# Patient Record
Sex: FEMALE | Race: WHITE | NOT HISPANIC OR LATINO
[De-identification: names, ages, dates, MRNs, and addresses within clinical notes are randomized per-mention and may not be internally consistent; named-entity substitution may affect disease eponyms.]

---

## 2020-07-28 ENCOUNTER — APPOINTMENT (OUTPATIENT)
Dept: COLORECTAL SURGERY | Facility: CLINIC | Age: 55
End: 2020-07-28
Payer: COMMERCIAL

## 2020-07-28 ENCOUNTER — TRANSCRIPTION ENCOUNTER (OUTPATIENT)
Age: 55
End: 2020-07-28

## 2020-07-28 VITALS
WEIGHT: 188 LBS | TEMPERATURE: 98.3 F | SYSTOLIC BLOOD PRESSURE: 159 MMHG | HEIGHT: 64 IN | HEART RATE: 96 BPM | DIASTOLIC BLOOD PRESSURE: 92 MMHG | BODY MASS INDEX: 32.1 KG/M2

## 2020-07-28 DIAGNOSIS — Z82.49 FAMILY HISTORY OF ISCHEMIC HEART DISEASE AND OTHER DISEASES OF THE CIRCULATORY SYSTEM: ICD-10-CM

## 2020-07-28 DIAGNOSIS — I10 ESSENTIAL (PRIMARY) HYPERTENSION: ICD-10-CM

## 2020-07-28 DIAGNOSIS — Z78.9 OTHER SPECIFIED HEALTH STATUS: ICD-10-CM

## 2020-07-28 DIAGNOSIS — Z80.0 FAMILY HISTORY OF MALIGNANT NEOPLASM OF DIGESTIVE ORGANS: ICD-10-CM

## 2020-07-28 DIAGNOSIS — D64.9 ANEMIA, UNSPECIFIED: ICD-10-CM

## 2020-07-28 DIAGNOSIS — K63.9 DISEASE OF INTESTINE, UNSPECIFIED: ICD-10-CM

## 2020-07-28 PROBLEM — Z00.00 ENCOUNTER FOR PREVENTIVE HEALTH EXAMINATION: Status: ACTIVE | Noted: 2020-07-28

## 2020-07-28 PROCEDURE — 99205 OFFICE O/P NEW HI 60 MIN: CPT

## 2020-07-28 RX ORDER — HYDROCHLOROTHIAZIDE 25 MG/1
25 TABLET ORAL
Refills: 0 | Status: ACTIVE | COMMUNITY

## 2020-07-28 RX ORDER — NEOMYCIN SULFATE 500 MG/1
500 TABLET ORAL
Qty: 6 | Refills: 0 | Status: ACTIVE | COMMUNITY
Start: 2020-07-28 | End: 1900-01-01

## 2020-07-28 RX ORDER — MULTIVIT-MIN/IRON/FOLIC ACID/K 18-600-40
500 CAPSULE ORAL
Refills: 0 | Status: ACTIVE | COMMUNITY

## 2020-07-28 RX ORDER — AMLODIPINE BESYLATE 10 MG/1
10 TABLET ORAL
Refills: 0 | Status: ACTIVE | COMMUNITY

## 2020-07-28 RX ORDER — ADHESIVE TAPE 3"X 2.3 YD
50 MCG TAPE, NON-MEDICATED TOPICAL
Refills: 0 | Status: ACTIVE | COMMUNITY

## 2020-07-28 RX ORDER — METRONIDAZOLE 500 MG/1
500 TABLET ORAL
Qty: 6 | Refills: 0 | Status: ACTIVE | COMMUNITY
Start: 2020-07-28 | End: 1900-01-01

## 2020-07-28 RX ORDER — FERROUS SULFATE 325(65) MG
325 (65 FE) TABLET ORAL
Refills: 0 | Status: ACTIVE | COMMUNITY

## 2020-07-28 NOTE — ASSESSMENT
[FreeTextEntry1] : I had extensive discussion with the patient (45 minutes) regarding the diagnosis and treatment options. I recommended that he consider proceeding with a robotic right hemicolectomy possible open procedure.\par \par The associated risks, benefits, alternatives of the procedure have been outlined discussed and reviewed with the patient's family. These risks including but not limited to bleeding, infection, anastomotic leak, need for secondary surgery, need for ileostomy or colostomy creation, change in bowel habits,  DVT, PE,  as well as the risk of heart and lung complications infection and death were detailed. The patient understands these risks and consents the planned procedure. Appropriate  literature regarding surgery and post operative treatment/complications and enhanced recovery pathway has been detailed and reviewed. Consent was obtained. All questions were answered.\par

## 2020-07-28 NOTE — HISTORY OF PRESENT ILLNESS
[FreeTextEntry1] : 53 yo F presents for evaluation of cecal mass, referred by Dr. Cardenas\par \par Patient had been experiencing worsening heartburn after switching blood pressure medications. She saw her PCP and cardiologist. Labs indicated anemia of Hgb/Hct 10.7/35.9, MCV 79, ferritin 7, iron 25, has since started iron supplementation.\par Underwent initial EGD/Colonoscopy w/ Dr. Chace Olvera at [facility] yesterday (20)\par Final report not available, however patient presents with scope images.\par (+) cecal mass, biopsied\par (+) diverticulum in sigmoid colon, inverted\par (+) skin tags\par EGD:\par (+) esophagitis in lower third of esophagus, hiatal hernia present\par (+) polyps in gastric body\par Patient admits to feeling more tired. Appetite has been normal, however slightly reduced this last week dur to anxiety.\par Denies fever, weight loss, abd pain, n/v.\par Denies change in BHs or BPR\par BH: daily\par Grandfather w/ cirrhosis/liver cancer ?metastasized to colon,  in age 40s. hx of alcoholism. Denies FMH colorectal CA in parents or siblings. Maternal Aunt w/ Crohn's disease.

## 2020-07-28 NOTE — PHYSICAL EXAM
[Abdomen Masses] : No abdominal masses [Abdomen Tenderness] : ~T No ~M abdominal tenderness [No HSM] : no hepatosplenomegaly [JVD] : no jugular venous distention  [Normal Heart Sounds] : normal heart sounds [Normal Breath Sounds] : Normal breath sounds [No Rash or Lesion] : No rash or lesion [Alert] : alert [Calm] : calm [de-identified] : well healed lower midline incision

## 2020-07-29 LAB
APTT BLD: 32.7 SEC
CEA SERPL-MCNC: 5.3 NG/ML
INR PPP: 1.01 RATIO
PT BLD: 11.9 SEC

## 2020-07-31 ENCOUNTER — OUTPATIENT (OUTPATIENT)
Dept: OUTPATIENT SERVICES | Facility: HOSPITAL | Age: 55
LOS: 1 days | End: 2020-07-31
Payer: COMMERCIAL

## 2020-07-31 ENCOUNTER — RESULT REVIEW (OUTPATIENT)
Age: 55
End: 2020-07-31

## 2020-07-31 DIAGNOSIS — C18.9 MALIGNANT NEOPLASM OF COLON, UNSPECIFIED: ICD-10-CM

## 2020-07-31 LAB — SURGICAL PATHOLOGY STUDY: SIGNIFICANT CHANGE UP

## 2020-07-31 PROCEDURE — 88321 CONSLTJ&REPRT SLD PREP ELSWR: CPT

## 2020-08-01 ENCOUNTER — RESULT REVIEW (OUTPATIENT)
Age: 55
End: 2020-08-01

## 2020-08-03 ENCOUNTER — TRANSCRIPTION ENCOUNTER (OUTPATIENT)
Age: 55
End: 2020-08-03

## 2020-08-03 NOTE — PATIENT PROFILE ADULT - VISION (WITH CORRECTIVE LENSES IF THE PATIENT USUALLY WEARS THEM):
Normal vision: sees adequately in most situations; can see medication labels, newsprint/wears contact

## 2020-08-04 ENCOUNTER — RESULT REVIEW (OUTPATIENT)
Age: 55
End: 2020-08-04

## 2020-08-04 ENCOUNTER — INPATIENT (INPATIENT)
Facility: HOSPITAL | Age: 55
LOS: 2 days | Discharge: HOME | DRG: 331 | End: 2020-08-07
Attending: SURGERY | Admitting: SURGERY
Payer: COMMERCIAL

## 2020-08-04 ENCOUNTER — APPOINTMENT (OUTPATIENT)
Dept: COLORECTAL SURGERY | Facility: HOSPITAL | Age: 55
End: 2020-08-04

## 2020-08-04 VITALS
WEIGHT: 185.85 LBS | TEMPERATURE: 97 F | RESPIRATION RATE: 18 BRPM | HEART RATE: 92 BPM | DIASTOLIC BLOOD PRESSURE: 75 MMHG | SYSTOLIC BLOOD PRESSURE: 162 MMHG | OXYGEN SATURATION: 99 % | HEIGHT: 64 IN

## 2020-08-04 DIAGNOSIS — Z90.89 ACQUIRED ABSENCE OF OTHER ORGANS: Chronic | ICD-10-CM

## 2020-08-04 DIAGNOSIS — Z98.891 HISTORY OF UTERINE SCAR FROM PREVIOUS SURGERY: Chronic | ICD-10-CM

## 2020-08-04 LAB
ANION GAP SERPL CALC-SCNC: 16 MMOL/L — SIGNIFICANT CHANGE UP (ref 5–17)
BUN SERPL-MCNC: 12 MG/DL — SIGNIFICANT CHANGE UP (ref 7–23)
CALCIUM SERPL-MCNC: 9.7 MG/DL — SIGNIFICANT CHANGE UP (ref 8.4–10.5)
CHLORIDE SERPL-SCNC: 99 MMOL/L — SIGNIFICANT CHANGE UP (ref 96–108)
CO2 SERPL-SCNC: 23 MMOL/L — SIGNIFICANT CHANGE UP (ref 22–31)
CREAT SERPL-MCNC: 0.94 MG/DL — SIGNIFICANT CHANGE UP (ref 0.5–1.3)
GLUCOSE BLDC GLUCOMTR-MCNC: 126 MG/DL — HIGH (ref 70–99)
GLUCOSE BLDC GLUCOMTR-MCNC: 172 MG/DL — HIGH (ref 70–99)
GLUCOSE SERPL-MCNC: 145 MG/DL — HIGH (ref 70–99)
HCT VFR BLD CALC: 37.5 % — SIGNIFICANT CHANGE UP (ref 34.5–45)
HGB BLD-MCNC: 11.7 G/DL — SIGNIFICANT CHANGE UP (ref 11.5–15.5)
MAGNESIUM SERPL-MCNC: 2.2 MG/DL — SIGNIFICANT CHANGE UP (ref 1.6–2.6)
MCHC RBC-ENTMCNC: 24.4 PG — LOW (ref 27–34)
MCHC RBC-ENTMCNC: 31.2 GM/DL — LOW (ref 32–36)
MCV RBC AUTO: 78.3 FL — LOW (ref 80–100)
NRBC # BLD: 0 /100 WBCS — SIGNIFICANT CHANGE UP (ref 0–0)
PHOSPHATE SERPL-MCNC: 3.5 MG/DL — SIGNIFICANT CHANGE UP (ref 2.5–4.5)
PLATELET # BLD AUTO: 357 K/UL — SIGNIFICANT CHANGE UP (ref 150–400)
POTASSIUM SERPL-MCNC: 2.9 MMOL/L — CRITICAL LOW (ref 3.5–5.3)
POTASSIUM SERPL-SCNC: 2.9 MMOL/L — CRITICAL LOW (ref 3.5–5.3)
RBC # BLD: 4.79 M/UL — SIGNIFICANT CHANGE UP (ref 3.8–5.2)
RBC # FLD: 18.2 % — HIGH (ref 10.3–14.5)
SODIUM SERPL-SCNC: 138 MMOL/L — SIGNIFICANT CHANGE UP (ref 135–145)
WBC # BLD: 9.54 K/UL — SIGNIFICANT CHANGE UP (ref 3.8–10.5)
WBC # FLD AUTO: 9.54 K/UL — SIGNIFICANT CHANGE UP (ref 3.8–10.5)

## 2020-08-04 PROCEDURE — 44205 LAP COLECTOMY PART W/ILEUM: CPT | Mod: GC

## 2020-08-04 PROCEDURE — 88309 TISSUE EXAM BY PATHOLOGIST: CPT | Mod: 26

## 2020-08-04 RX ORDER — HYDROMORPHONE HYDROCHLORIDE 2 MG/ML
0.5 INJECTION INTRAMUSCULAR; INTRAVENOUS; SUBCUTANEOUS EVERY 4 HOURS
Refills: 0 | Status: DISCONTINUED | OUTPATIENT
Start: 2020-08-04 | End: 2020-08-06

## 2020-08-04 RX ORDER — OXYCODONE HYDROCHLORIDE 5 MG/1
5 TABLET ORAL EVERY 4 HOURS
Refills: 0 | Status: DISCONTINUED | OUTPATIENT
Start: 2020-08-04 | End: 2020-08-05

## 2020-08-04 RX ORDER — ENOXAPARIN SODIUM 100 MG/ML
40 INJECTION SUBCUTANEOUS EVERY 24 HOURS
Refills: 0 | Status: DISCONTINUED | OUTPATIENT
Start: 2020-08-04 | End: 2020-08-07

## 2020-08-04 RX ORDER — ACETAMINOPHEN 500 MG
1000 TABLET ORAL ONCE
Refills: 0 | Status: COMPLETED | OUTPATIENT
Start: 2020-08-04 | End: 2020-08-04

## 2020-08-04 RX ORDER — AMLODIPINE BESYLATE 2.5 MG/1
10 TABLET ORAL DAILY
Refills: 0 | Status: DISCONTINUED | OUTPATIENT
Start: 2020-08-05 | End: 2020-08-07

## 2020-08-04 RX ORDER — ACETAMINOPHEN 500 MG
1000 TABLET ORAL EVERY 6 HOURS
Refills: 0 | Status: DISCONTINUED | OUTPATIENT
Start: 2020-08-04 | End: 2020-08-07

## 2020-08-04 RX ORDER — OXYCODONE HYDROCHLORIDE 5 MG/1
2.5 TABLET ORAL EVERY 4 HOURS
Refills: 0 | Status: DISCONTINUED | OUTPATIENT
Start: 2020-08-04 | End: 2020-08-05

## 2020-08-04 RX ORDER — POTASSIUM CHLORIDE 20 MEQ
40 PACKET (EA) ORAL EVERY 4 HOURS
Refills: 0 | Status: COMPLETED | OUTPATIENT
Start: 2020-08-04 | End: 2020-08-04

## 2020-08-04 RX ORDER — POTASSIUM CHLORIDE 20 MEQ
10 PACKET (EA) ORAL
Refills: 0 | Status: COMPLETED | OUTPATIENT
Start: 2020-08-04 | End: 2020-08-04

## 2020-08-04 RX ORDER — SODIUM CHLORIDE 9 MG/ML
1000 INJECTION, SOLUTION INTRAVENOUS
Refills: 0 | Status: DISCONTINUED | OUTPATIENT
Start: 2020-08-04 | End: 2020-08-07

## 2020-08-04 RX ORDER — ONDANSETRON 8 MG/1
4 TABLET, FILM COATED ORAL EVERY 6 HOURS
Refills: 0 | Status: DISCONTINUED | OUTPATIENT
Start: 2020-08-04 | End: 2020-08-07

## 2020-08-04 RX ORDER — HEPARIN SODIUM 5000 [USP'U]/ML
5000 INJECTION INTRAVENOUS; SUBCUTANEOUS ONCE
Refills: 0 | Status: COMPLETED | OUTPATIENT
Start: 2020-08-04 | End: 2020-08-04

## 2020-08-04 RX ORDER — GABAPENTIN 400 MG/1
600 CAPSULE ORAL ONCE
Refills: 0 | Status: COMPLETED | OUTPATIENT
Start: 2020-08-04 | End: 2020-08-04

## 2020-08-04 RX ADMIN — Medication 1000 MILLIGRAM(S): at 14:16

## 2020-08-04 RX ADMIN — HEPARIN SODIUM 5000 UNIT(S): 5000 INJECTION INTRAVENOUS; SUBCUTANEOUS at 07:45

## 2020-08-04 RX ADMIN — ENOXAPARIN SODIUM 40 MILLIGRAM(S): 100 INJECTION SUBCUTANEOUS at 17:42

## 2020-08-04 RX ADMIN — Medication 100 MILLIEQUIVALENT(S): at 17:43

## 2020-08-04 RX ADMIN — Medication 100 MILLIEQUIVALENT(S): at 20:57

## 2020-08-04 RX ADMIN — Medication 1000 MILLIGRAM(S): at 20:57

## 2020-08-04 RX ADMIN — Medication 1000 MILLIGRAM(S): at 21:57

## 2020-08-04 RX ADMIN — Medication 1000 MILLIGRAM(S): at 14:01

## 2020-08-04 RX ADMIN — HYDROMORPHONE HYDROCHLORIDE 0.5 MILLIGRAM(S): 2 INJECTION INTRAMUSCULAR; INTRAVENOUS; SUBCUTANEOUS at 12:50

## 2020-08-04 RX ADMIN — HYDROMORPHONE HYDROCHLORIDE 0.5 MILLIGRAM(S): 2 INJECTION INTRAMUSCULAR; INTRAVENOUS; SUBCUTANEOUS at 12:35

## 2020-08-04 RX ADMIN — Medication 40 MILLIEQUIVALENT(S): at 17:42

## 2020-08-04 RX ADMIN — Medication 40 MILLIEQUIVALENT(S): at 14:01

## 2020-08-04 RX ADMIN — GABAPENTIN 600 MILLIGRAM(S): 400 CAPSULE ORAL at 07:44

## 2020-08-04 RX ADMIN — Medication 100 MILLIEQUIVALENT(S): at 14:17

## 2020-08-04 RX ADMIN — Medication 1000 MILLIGRAM(S): at 07:44

## 2020-08-04 NOTE — BRIEF OPERATIVE NOTE - NSICDXBRIEFPROCEDURE_GEN_ALL_CORE_FT
PROCEDURES:  Robot-assisted laparoscopic right hemicolectomy using da Linda Xi 04-Aug-2020 11:23:41  Niko Mann

## 2020-08-04 NOTE — BRIEF OPERATIVE NOTE - OPERATION/FINDINGS
Diagnostic laparoscopy revealing large cecal mass. Robotic mobilization (media-to-lateral) of ascending colon with visualization and protection of duodenum and right ureter. Right branch of the middle colic and ileocolic vessels identified, isolated and ligated using robotic vessel sealer. Terminal ileum and transverse colon divided using the blue robotic stapler. ICG used and verified perfusion of both resection margins. Subsequent creation of isoperistaltic side-to-side ileocolic anastomosis with robotic blue 60mm stapler and common enterotomy closed using running barbed suture in 2layers. Hemostasis achieved at the end of the case. Fascia closed primarily in multiple layers using colorectal bundle closing tray. Skin closed with monocryl.

## 2020-08-04 NOTE — H&P ADULT - NSHPPHYSICALEXAM_GEN_ALL_CORE
Constitutional: WDWN NAD  Heart: S1 S2  Lungs: breathing with minimal effort, without use of accessory muscles  Abdomen: soft, nontender, nondistended, without masses, ecchymosis,erythema

## 2020-08-04 NOTE — H&P ADULT - ASSESSMENT
54 year old female with PMH of HTN, cecal mass presents for right hemicolectomy.     Plan:  To OR  ERAS protocol  colon bundle  admission post op

## 2020-08-04 NOTE — H&P ADULT - HISTORY OF PRESENT ILLNESS
54 year old female with PMH of HTN, cecal mass presents for right hemicolectomy. Patient originally presented to her PCP and cardiologist with worsening heartburn after switching blood pressure medications and was found to have iron deficiency anemia. Patient underwent EGD and colonscopy on 7/27/20 which found a cecal mass that was biopsied. Patient currently has no complaints.

## 2020-08-04 NOTE — PROGRESS NOTE ADULT - SUBJECTIVE AND OBJECTIVE BOX
Procedure: Right Hemicolectomy   Surgeon: Danae    S: Pt has no complaints. Denies CP, SOB, HARRY, calf tenderness. Pain controlled with medication.    O:  T(C): 36.9 (08-04-20 @ 11:32), Max: 36.9 (08-04-20 @ 11:32)  T(F): 98.5 (08-04-20 @ 11:32), Max: 98.5 (08-04-20 @ 11:32)  HR: 85 (08-04-20 @ 13:00) (85 - 102)  BP: 146/75 (08-04-20 @ 13:00) (141/73 - 148/78)  RR: 11 (08-04-20 @ 13:00) (10 - 25)  SpO2: 99% (08-04-20 @ 13:00) (99% - 100%)  Wt(kg): --                        11.7   9.54  )-----------( 357      ( 04 Aug 2020 12:41 )             37.5     08-04    138  |  99  |  12  ----------------------------<  145<H>  2.9<LL>   |  23  |  0.94    Ca    9.7      04 Aug 2020 12:41  Phos  3.5     08-04  Mg     2.2     08-04        Gen: NAD, resting comfortably in bed  C/V: NSR  Pulm: Nonlabored breathing, no respiratory distress  Abd: soft, NT/ND Incision: CDI  Extrem: WWP, no calf edema, SCDs in place      A/P: 54yFemale s/p Right hemicolectomy   Diet: CLD  IVF: LR@40cc/hr  Pain/nausea control  DVT ppx: LVX  Hull to gravity

## 2020-08-05 LAB
ANION GAP SERPL CALC-SCNC: 11 MMOL/L — SIGNIFICANT CHANGE UP (ref 5–17)
BASOPHILS # BLD AUTO: 0.03 K/UL — SIGNIFICANT CHANGE UP (ref 0–0.2)
BASOPHILS NFR BLD AUTO: 0.3 % — SIGNIFICANT CHANGE UP (ref 0–2)
BUN SERPL-MCNC: 11 MG/DL — SIGNIFICANT CHANGE UP (ref 7–23)
CALCIUM SERPL-MCNC: 9.1 MG/DL — SIGNIFICANT CHANGE UP (ref 8.4–10.5)
CHLORIDE SERPL-SCNC: 102 MMOL/L — SIGNIFICANT CHANGE UP (ref 96–108)
CO2 SERPL-SCNC: 26 MMOL/L — SIGNIFICANT CHANGE UP (ref 22–31)
CREAT SERPL-MCNC: 0.66 MG/DL — SIGNIFICANT CHANGE UP (ref 0.5–1.3)
EOSINOPHIL # BLD AUTO: 0 K/UL — SIGNIFICANT CHANGE UP (ref 0–0.5)
EOSINOPHIL NFR BLD AUTO: 0 % — SIGNIFICANT CHANGE UP (ref 0–6)
GLUCOSE SERPL-MCNC: 115 MG/DL — HIGH (ref 70–99)
HCT VFR BLD CALC: 33.1 % — LOW (ref 34.5–45)
HGB BLD-MCNC: 10.3 G/DL — LOW (ref 11.5–15.5)
IMM GRANULOCYTES NFR BLD AUTO: 0.4 % — SIGNIFICANT CHANGE UP (ref 0–1.5)
LYMPHOCYTES # BLD AUTO: 1.93 K/UL — SIGNIFICANT CHANGE UP (ref 1–3.3)
LYMPHOCYTES # BLD AUTO: 17.3 % — SIGNIFICANT CHANGE UP (ref 13–44)
MAGNESIUM SERPL-MCNC: 2.3 MG/DL — SIGNIFICANT CHANGE UP (ref 1.6–2.6)
MCHC RBC-ENTMCNC: 24.8 PG — LOW (ref 27–34)
MCHC RBC-ENTMCNC: 31.1 GM/DL — LOW (ref 32–36)
MCV RBC AUTO: 79.8 FL — LOW (ref 80–100)
MONOCYTES # BLD AUTO: 1.14 K/UL — HIGH (ref 0–0.9)
MONOCYTES NFR BLD AUTO: 10.2 % — SIGNIFICANT CHANGE UP (ref 2–14)
NEUTROPHILS # BLD AUTO: 8.04 K/UL — HIGH (ref 1.8–7.4)
NEUTROPHILS NFR BLD AUTO: 71.8 % — SIGNIFICANT CHANGE UP (ref 43–77)
NRBC # BLD: 0 /100 WBCS — SIGNIFICANT CHANGE UP (ref 0–0)
PHOSPHATE SERPL-MCNC: 2.6 MG/DL — SIGNIFICANT CHANGE UP (ref 2.5–4.5)
PLATELET # BLD AUTO: 332 K/UL — SIGNIFICANT CHANGE UP (ref 150–400)
POTASSIUM SERPL-MCNC: 3.4 MMOL/L — LOW (ref 3.5–5.3)
POTASSIUM SERPL-SCNC: 3.4 MMOL/L — LOW (ref 3.5–5.3)
RBC # BLD: 4.15 M/UL — SIGNIFICANT CHANGE UP (ref 3.8–5.2)
RBC # FLD: 18.8 % — HIGH (ref 10.3–14.5)
SODIUM SERPL-SCNC: 139 MMOL/L — SIGNIFICANT CHANGE UP (ref 135–145)
WBC # BLD: 11.18 K/UL — HIGH (ref 3.8–10.5)
WBC # FLD AUTO: 11.18 K/UL — HIGH (ref 3.8–10.5)

## 2020-08-05 RX ORDER — OXYCODONE HYDROCHLORIDE 5 MG/1
5 TABLET ORAL EVERY 4 HOURS
Refills: 0 | Status: DISCONTINUED | OUTPATIENT
Start: 2020-08-05 | End: 2020-08-07

## 2020-08-05 RX ORDER — OXYCODONE HYDROCHLORIDE 5 MG/1
10 TABLET ORAL EVERY 4 HOURS
Refills: 0 | Status: DISCONTINUED | OUTPATIENT
Start: 2020-08-05 | End: 2020-08-07

## 2020-08-05 RX ORDER — POTASSIUM CHLORIDE 20 MEQ
40 PACKET (EA) ORAL ONCE
Refills: 0 | Status: COMPLETED | OUTPATIENT
Start: 2020-08-05 | End: 2020-08-05

## 2020-08-05 RX ORDER — POTASSIUM PHOSPHATE, MONOBASIC POTASSIUM PHOSPHATE, DIBASIC 236; 224 MG/ML; MG/ML
15 INJECTION, SOLUTION INTRAVENOUS ONCE
Refills: 0 | Status: COMPLETED | OUTPATIENT
Start: 2020-08-05 | End: 2020-08-05

## 2020-08-05 RX ORDER — LANOLIN ALCOHOL/MO/W.PET/CERES
3 CREAM (GRAM) TOPICAL ONCE
Refills: 0 | Status: DISCONTINUED | OUTPATIENT
Start: 2020-08-05 | End: 2020-08-07

## 2020-08-05 RX ADMIN — Medication 1000 MILLIGRAM(S): at 15:24

## 2020-08-05 RX ADMIN — HYDROMORPHONE HYDROCHLORIDE 0.5 MILLIGRAM(S): 2 INJECTION INTRAMUSCULAR; INTRAVENOUS; SUBCUTANEOUS at 05:25

## 2020-08-05 RX ADMIN — OXYCODONE HYDROCHLORIDE 5 MILLIGRAM(S): 5 TABLET ORAL at 01:04

## 2020-08-05 RX ADMIN — OXYCODONE HYDROCHLORIDE 5 MILLIGRAM(S): 5 TABLET ORAL at 00:04

## 2020-08-05 RX ADMIN — HYDROMORPHONE HYDROCHLORIDE 0.5 MILLIGRAM(S): 2 INJECTION INTRAMUSCULAR; INTRAVENOUS; SUBCUTANEOUS at 05:10

## 2020-08-05 RX ADMIN — OXYCODONE HYDROCHLORIDE 5 MILLIGRAM(S): 5 TABLET ORAL at 08:16

## 2020-08-05 RX ADMIN — ENOXAPARIN SODIUM 40 MILLIGRAM(S): 100 INJECTION SUBCUTANEOUS at 21:24

## 2020-08-05 RX ADMIN — Medication 1000 MILLIGRAM(S): at 09:13

## 2020-08-05 RX ADMIN — POTASSIUM PHOSPHATE, MONOBASIC POTASSIUM PHOSPHATE, DIBASIC 63.75 MILLIMOLE(S): 236; 224 INJECTION, SOLUTION INTRAVENOUS at 11:52

## 2020-08-05 RX ADMIN — AMLODIPINE BESYLATE 10 MILLIGRAM(S): 2.5 TABLET ORAL at 05:09

## 2020-08-05 RX ADMIN — Medication 1000 MILLIGRAM(S): at 04:29

## 2020-08-05 RX ADMIN — OXYCODONE HYDROCHLORIDE 10 MILLIGRAM(S): 5 TABLET ORAL at 13:44

## 2020-08-05 RX ADMIN — Medication 1000 MILLIGRAM(S): at 16:24

## 2020-08-05 RX ADMIN — Medication 1000 MILLIGRAM(S): at 08:16

## 2020-08-05 RX ADMIN — OXYCODONE HYDROCHLORIDE 5 MILLIGRAM(S): 5 TABLET ORAL at 09:13

## 2020-08-05 RX ADMIN — Medication 1000 MILLIGRAM(S): at 21:24

## 2020-08-05 RX ADMIN — OXYCODONE HYDROCHLORIDE 10 MILLIGRAM(S): 5 TABLET ORAL at 12:45

## 2020-08-05 RX ADMIN — Medication 40 MILLIEQUIVALENT(S): at 11:53

## 2020-08-05 RX ADMIN — Medication 1000 MILLIGRAM(S): at 03:29

## 2020-08-05 NOTE — PROGRESS NOTE ADULT - ASSESSMENT
53 y/o F w/ cecal mass now s/p Right hemicolectomy (8/4) for cecal mass     ERAS protocol   CLD  IVF @40  Pain/nausea control (Toradol allergy)  LVX, SCDs, OOBA, IS   am labs  d/c BRIA gutiérrez 3pm

## 2020-08-05 NOTE — PROGRESS NOTE ADULT - SUBJECTIVE AND OBJECTIVE BOX
SUBJECTIVE: Pt seen and examined at bedside with chief. Pt denies any complaints. Pain well controlled. Tolerating diet without N/V. Denies any BF    MEDICATIONS  (STANDING):  acetaminophen   Tablet .. 1000 milliGRAM(s) Oral every 6 hours  amLODIPine   Tablet 10 milliGRAM(s) Oral daily  enoxaparin Injectable 40 milliGRAM(s) SubCutaneous every 24 hours  lactated ringers. 1000 milliLiter(s) (40 mL/Hr) IV Continuous <Continuous>    MEDICATIONS  (PRN):  HYDROmorphone  Injectable 0.5 milliGRAM(s) IV Push every 4 hours PRN breakthrough pain  ondansetron Injectable 4 milliGRAM(s) IV Push every 6 hours PRN Nausea and/or Vomiting  oxyCODONE    IR 5 milliGRAM(s) Oral every 4 hours PRN Moderate Pain (4 - 6)  oxyCODONE    IR 10 milliGRAM(s) Oral every 4 hours PRN Severe Pain (7 - 10)      Vital Signs Last 24 Hrs  T(C): 36.9 (05 Aug 2020 05:19), Max: 37.1 (04 Aug 2020 20:55)  T(F): 98.4 (05 Aug 2020 05:19), Max: 98.8 (04 Aug 2020 20:55)  HR: 75 (05 Aug 2020 05:19) (75 - 102)  BP: 108/73 (05 Aug 2020 05:19) (108/73 - 162/75)  BP(mean): 97 (04 Aug 2020 14:00) (97 - 107)  RR: 17 (05 Aug 2020 05:19) (9 - 25)  SpO2: 96% (05 Aug 2020 05:19) (95% - 100%)    PHYSICAL EXAM:      Constitutional: A&Ox3    Respiratory: non labored breathing, no respiratory distress    Cardiovascular: NSR, RRR    Gastrointestinal: soft ND, appropriately tender                 Incision: CDI    Genitourinary: Hull to gravity     Extremities: (-) edema                  I&O's Detail    04 Aug 2020 07:01  -  05 Aug 2020 07:00  --------------------------------------------------------  IN:    Lactated Ringers IV Bolus: 1200 mL    lactated ringers.: 640 mL  Total IN: 1840 mL    OUT:    Estimated Blood Loss: 10 mL    Indwelling Catheter - Urethral: 1330 mL    Voided: 750 mL  Total OUT: 2090 mL    Total NET: -250 mL          LABS:                        10.3   11.18 )-----------( 332      ( 05 Aug 2020 06:17 )             33.1     08-05    139  |  102  |  11  ----------------------------<  115<H>  3.4<L>   |  26  |  0.66    Ca    9.1      05 Aug 2020 06:17  Phos  2.6     08-05  Mg     2.3     08-05            RADIOLOGY & ADDITIONAL STUDIES:

## 2020-08-05 NOTE — DIETITIAN INITIAL EVALUATION ADULT. - ADD RECOMMEND
1. Encourage intake through day 2. Advance diet as feasible to FLD-Regular 3. Manage pain prn 4. Trend wts 5. Monitor and replete lytes

## 2020-08-05 NOTE — DIETITIAN INITIAL EVALUATION ADULT. - ENERGY NEEDS
Ideal body weight used for calculations as pt >120% of IBW.   ABW 84.3kg, IBW 54kg, 155% IBW, ht 64", BMI 31.9   Nutrient needs based on [facility] standards of care for maintenance in adults, adjusted for post-op needs, age, fluid per team

## 2020-08-05 NOTE — DIETITIAN INITIAL EVALUATION ADULT. - OTHER INFO
54F with PMH of HTN, cecal mass presents for right hemicolectomy. Patient originally presented to her PCP and cardiologist with worsening heartburn after switching blood pressure medications and was found to have iron deficiency anemia. Patient underwent EGD and colposcopy on 7/27/20 which found a cecal mass that was biopsied. On 8/4 underwent robot assisted laparoscopic R hemicolectomy, diet advanced to clears today, tolerating fairly. Observed pt resting in bed, lethargic, pain noted to abd, being managed. No reported n/v, still no BM or flatus, skin with surgical incision to abdomen. NKFA or changes in wt endorsed, typically with regular diet at home. Discussed likely stages of diet advancement with pt, receptive. Continues to be managed post-op at this time. Will follow per protocol.

## 2020-08-06 LAB
ANION GAP SERPL CALC-SCNC: 9 MMOL/L — SIGNIFICANT CHANGE UP (ref 5–17)
BUN SERPL-MCNC: 14 MG/DL — SIGNIFICANT CHANGE UP (ref 7–23)
CALCIUM SERPL-MCNC: 8.7 MG/DL — SIGNIFICANT CHANGE UP (ref 8.4–10.5)
CHLORIDE SERPL-SCNC: 103 MMOL/L — SIGNIFICANT CHANGE UP (ref 96–108)
CO2 SERPL-SCNC: 25 MMOL/L — SIGNIFICANT CHANGE UP (ref 22–31)
CREAT SERPL-MCNC: 0.55 MG/DL — SIGNIFICANT CHANGE UP (ref 0.5–1.3)
GLUCOSE SERPL-MCNC: 111 MG/DL — HIGH (ref 70–99)
HCT VFR BLD CALC: 35 % — SIGNIFICANT CHANGE UP (ref 34.5–45)
HGB BLD-MCNC: 10.9 G/DL — LOW (ref 11.5–15.5)
MAGNESIUM SERPL-MCNC: 2.1 MG/DL — SIGNIFICANT CHANGE UP (ref 1.6–2.6)
MCHC RBC-ENTMCNC: 25.2 PG — LOW (ref 27–34)
MCHC RBC-ENTMCNC: 31.1 GM/DL — LOW (ref 32–36)
MCV RBC AUTO: 81 FL — SIGNIFICANT CHANGE UP (ref 80–100)
NRBC # BLD: 0 /100 WBCS — SIGNIFICANT CHANGE UP (ref 0–0)
PHOSPHATE SERPL-MCNC: 2.4 MG/DL — LOW (ref 2.5–4.5)
PLATELET # BLD AUTO: 313 K/UL — SIGNIFICANT CHANGE UP (ref 150–400)
POTASSIUM SERPL-MCNC: 3.4 MMOL/L — LOW (ref 3.5–5.3)
POTASSIUM SERPL-SCNC: 3.4 MMOL/L — LOW (ref 3.5–5.3)
RBC # BLD: 4.32 M/UL — SIGNIFICANT CHANGE UP (ref 3.8–5.2)
RBC # FLD: 18.8 % — HIGH (ref 10.3–14.5)
SODIUM SERPL-SCNC: 137 MMOL/L — SIGNIFICANT CHANGE UP (ref 135–145)
WBC # BLD: 10.04 K/UL — SIGNIFICANT CHANGE UP (ref 3.8–10.5)
WBC # FLD AUTO: 10.04 K/UL — SIGNIFICANT CHANGE UP (ref 3.8–10.5)

## 2020-08-06 RX ORDER — POTASSIUM PHOSPHATE, MONOBASIC POTASSIUM PHOSPHATE, DIBASIC 236; 224 MG/ML; MG/ML
15 INJECTION, SOLUTION INTRAVENOUS ONCE
Refills: 0 | Status: COMPLETED | OUTPATIENT
Start: 2020-08-06 | End: 2020-08-06

## 2020-08-06 RX ORDER — FAMOTIDINE 10 MG/ML
20 INJECTION INTRAVENOUS ONCE
Refills: 0 | Status: COMPLETED | OUTPATIENT
Start: 2020-08-06 | End: 2020-08-06

## 2020-08-06 RX ORDER — POTASSIUM CHLORIDE 20 MEQ
40 PACKET (EA) ORAL ONCE
Refills: 0 | Status: COMPLETED | OUTPATIENT
Start: 2020-08-06 | End: 2020-08-06

## 2020-08-06 RX ADMIN — FAMOTIDINE 20 MILLIGRAM(S): 10 INJECTION INTRAVENOUS at 05:38

## 2020-08-06 RX ADMIN — OXYCODONE HYDROCHLORIDE 10 MILLIGRAM(S): 5 TABLET ORAL at 10:21

## 2020-08-06 RX ADMIN — OXYCODONE HYDROCHLORIDE 10 MILLIGRAM(S): 5 TABLET ORAL at 18:18

## 2020-08-06 RX ADMIN — AMLODIPINE BESYLATE 10 MILLIGRAM(S): 2.5 TABLET ORAL at 13:08

## 2020-08-06 RX ADMIN — ENOXAPARIN SODIUM 40 MILLIGRAM(S): 100 INJECTION SUBCUTANEOUS at 17:02

## 2020-08-06 RX ADMIN — FAMOTIDINE 20 MILLIGRAM(S): 10 INJECTION INTRAVENOUS at 22:13

## 2020-08-06 RX ADMIN — OXYCODONE HYDROCHLORIDE 10 MILLIGRAM(S): 5 TABLET ORAL at 21:31

## 2020-08-06 RX ADMIN — Medication 1000 MILLIGRAM(S): at 22:24

## 2020-08-06 RX ADMIN — OXYCODONE HYDROCHLORIDE 10 MILLIGRAM(S): 5 TABLET ORAL at 09:07

## 2020-08-06 RX ADMIN — Medication 1000 MILLIGRAM(S): at 05:38

## 2020-08-06 RX ADMIN — OXYCODONE HYDROCHLORIDE 10 MILLIGRAM(S): 5 TABLET ORAL at 22:31

## 2020-08-06 RX ADMIN — OXYCODONE HYDROCHLORIDE 10 MILLIGRAM(S): 5 TABLET ORAL at 17:02

## 2020-08-06 RX ADMIN — Medication 1000 MILLIGRAM(S): at 14:09

## 2020-08-06 RX ADMIN — Medication 1000 MILLIGRAM(S): at 18:18

## 2020-08-06 RX ADMIN — Medication 1000 MILLIGRAM(S): at 09:07

## 2020-08-06 RX ADMIN — OXYCODONE HYDROCHLORIDE 10 MILLIGRAM(S): 5 TABLET ORAL at 12:12

## 2020-08-06 RX ADMIN — Medication 1000 MILLIGRAM(S): at 13:07

## 2020-08-06 RX ADMIN — Medication 1000 MILLIGRAM(S): at 17:02

## 2020-08-06 RX ADMIN — Medication 40 MILLIEQUIVALENT(S): at 10:21

## 2020-08-06 RX ADMIN — OXYCODONE HYDROCHLORIDE 10 MILLIGRAM(S): 5 TABLET ORAL at 03:04

## 2020-08-06 RX ADMIN — POTASSIUM PHOSPHATE, MONOBASIC POTASSIUM PHOSPHATE, DIBASIC 63.75 MILLIMOLE(S): 236; 224 INJECTION, SOLUTION INTRAVENOUS at 10:21

## 2020-08-06 NOTE — CHART NOTE - NSCHARTNOTEFT_GEN_A_CORE
Admitting Diagnosis:   Patient is a 54y old  Female who presents with a chief complaint of right hemicolectomy (06 Aug 2020 08:07)      PAST MEDICAL & SURGICAL HISTORY:  HTN (hypertension)  History of tonsillectomy  History of : x3    Current Nutrition Order:   Diet, Low Fiber (20 @ 08:19)    PO Intake: Good (%) [   ]  Fair (50-75%) [ x  ] Poor (<25%) [   ]- Pt tolerating CLD well, diet now advanced to LFD.     GI Issues:   WDL  No n/v/d/c noted  No abd distention noted    Pain:  7/10 abd pain noted-well controlled with current bowel regime    Skin Integrity:  Surgical incision, carli score 21  No edema present  No pressure ulcers noted    Labs:       137  |  103  |  14  ----------------------------<  111<H>  3.4<L>   |  25  |  0.55    Ca    8.7      06 Aug 2020 08:25  Phos  2.4     08-06  Mg     2.1     08-06    CAPILLARY BLOOD GLUCOSE    Medications:  MEDICATIONS  (STANDING):  acetaminophen   Tablet .. 1000 milliGRAM(s) Oral every 6 hours  amLODIPine   Tablet 10 milliGRAM(s) Oral daily  enoxaparin Injectable 40 milliGRAM(s) SubCutaneous every 24 hours  lactated ringers. 1000 milliLiter(s) (40 mL/Hr) IV Continuous <Continuous>    MEDICATIONS  (PRN):  melatonin 3 milliGRAM(s) Oral once PRN Insomnia  ondansetron Injectable 4 milliGRAM(s) IV Push every 6 hours PRN Nausea and/or Vomiting  oxyCODONE    IR 5 milliGRAM(s) Oral every 4 hours PRN Moderate Pain (4 - 6)  oxyCODONE    IR 10 milliGRAM(s) Oral every 4 hours PRN Severe Pain (7 - 10)    Admitting Anthropometrics:  ABW 84.3kg, IBW 54kg, 155% IBW, ht 64", BMI 31.9     Weight:   186lbs    Weight Change: No new weights obtained this admission. Per previous assessment, pt noted no changes in wt. Cont to trend weights biweekly.     Nutrition Focused Physical Exam: Completed [   ]  Not Pertinent [ x  ]    Estimated energy needs:   Ideal (54kg) body weight used for calculations as pt >120% of IBW.   Nutrient needs based on [facility] standards of care for maintenance in adults, adjusted for post-op needs, age, fluid per team  Kcal (25-30 kcal/kg): 6001-4297 kcal  Protein (1.2-1.4 g/kg pro): 65-76 g pro    Subjective:   54F with PMH of HTN, cecal mass presents for right hemicolectomy. Patient originally presented to her PCP and cardiologist with worsening heartburn after switching blood pressure medications and was found to have iron deficiency anemia. Patient underwent EGD and colposcopy on 20 which found a cecal mass that was biopsied. On  underwent robot assisted laparoscopic R hemicolectomy- ERAS protocol. Pt was tolerating CLD well with multiple BMs and F. Diet just advanced to LFD this am per disc with RN- cont to encourage adequate PO intake Pt appears more awake this am. On assessment, pt is resting in bed wihtout complaints. Reviewed low fiber diet guideline with pt- pt appears receptive. Pain well controlled on pain regime. Please see recs below. RD to follow up per protocol.     Previous Nutrition Diagnosis: Increased kcal and pro Needs r/t post-op demand AEB hypermetabolic state.     Active [ x  ]  Resolved [   ]    If resolved, new PES: N/A    Goal/Expected Outcome meet >75% EER with diet advancement.    Recommendations:  1. Cont with LFD  2. Cont to encourage adequate PO intake throughout the day  3. Cont to monitor pan and bowel function and adjust regimes per team  4. Trend weights biweekly  5. Monitor and replete lytes prn  6. RD diet education reenforcement prn    Education: Reviewed low fiber diet, pt receptive.     Risk Level: High [   ] Moderate [ x  ] Low [   ]

## 2020-08-06 NOTE — PROGRESS NOTE ADULT - SUBJECTIVE AND OBJECTIVE BOX
SUBJECTIVE: Pt seen and examined by chief resident. No acute events overnight. Patient reports pain is under control, tolerating clear liquid diet without nausea or vomiting, and is having multiple episodes of flatus and solid/liquid bowel movements.        MEDICATIONS  (STANDING):  acetaminophen   Tablet .. 1000 milliGRAM(s) Oral every 6 hours  amLODIPine   Tablet 10 milliGRAM(s) Oral daily  enoxaparin Injectable 40 milliGRAM(s) SubCutaneous every 24 hours  lactated ringers. 1000 milliLiter(s) (40 mL/Hr) IV Continuous <Continuous>    MEDICATIONS  (PRN):  melatonin 3 milliGRAM(s) Oral once PRN Insomnia  ondansetron Injectable 4 milliGRAM(s) IV Push every 6 hours PRN Nausea and/or Vomiting  oxyCODONE    IR 5 milliGRAM(s) Oral every 4 hours PRN Moderate Pain (4 - 6)  oxyCODONE    IR 10 milliGRAM(s) Oral every 4 hours PRN Severe Pain (7 - 10)      Vital Signs Last 24 Hrs  T(C): 36.8 (06 Aug 2020 05:34), Max: 37.1 (05 Aug 2020 13:48)  T(F): 98.2 (06 Aug 2020 05:34), Max: 98.8 (05 Aug 2020 13:48)  HR: 78 (06 Aug 2020 05:34) (76 - 82)  BP: 147/84 (06 Aug 2020 05:34) (135/70 - 154/82)  BP(mean): --  RR: 17 (06 Aug 2020 05:34) (17 - 18)  SpO2: 95% (06 Aug 2020 05:34) (93% - 96%)    Physical Exam:  GENERAL: NAD, Resting comfortably in bed  RESP: Nonlabored breathing, No respiratory distress  CARD: Normal rate, Normal peripheral perfusion  GI: Soft, NT, ND, no guarding, no rebound tenderness, incisions are clean/dry without drainage/intact, no hematoma  EXTREM: WWP, No edema, SCDs in place    I&O's Summary    05 Aug 2020 07:01  -  06 Aug 2020 07:00  --------------------------------------------------------  IN: 1069 mL / OUT: 1750 mL / NET: -681 mL        LABS:                        10.3   11.18 )-----------( 332      ( 05 Aug 2020 06:17 )             33.1     08-05    139  |  102  |  11  ----------------------------<  115<H>  3.4<L>   |  26  |  0.66    Ca    9.1      05 Aug 2020 06:17  Phos  2.6     08-05  Mg     2.3     08-05          CAPILLARY BLOOD GLUCOSE

## 2020-08-06 NOTE — PROGRESS NOTE ADULT - ASSESSMENT
55 y/o F w/ cecal mass now s/p Right hemicolectomy for cecal mass on 8/4 POD 2    CLD/IVF  Pain/Nausea control (Toradol allergy)  SQL/SCDs, HEENA, IS   [facility] labs 55 y/o F w/ cecal mass now s/p Right hemicolectomy for cecal mass on 8/4 POD 2    Adv to LRD  ERAS protocol   CLD/IVF  Pain/Nausea control (Toradol allergy)  SQL/SCDs, HEENA, IS   [facility] labs

## 2020-08-07 ENCOUNTER — TRANSCRIPTION ENCOUNTER (OUTPATIENT)
Age: 55
End: 2020-08-07

## 2020-08-07 VITALS
DIASTOLIC BLOOD PRESSURE: 78 MMHG | HEART RATE: 79 BPM | OXYGEN SATURATION: 96 % | TEMPERATURE: 99 F | SYSTOLIC BLOOD PRESSURE: 139 MMHG | RESPIRATION RATE: 17 BRPM

## 2020-08-07 LAB — SURGICAL PATHOLOGY STUDY: SIGNIFICANT CHANGE UP

## 2020-08-07 PROCEDURE — 36415 COLL VENOUS BLD VENIPUNCTURE: CPT

## 2020-08-07 PROCEDURE — 84100 ASSAY OF PHOSPHORUS: CPT

## 2020-08-07 PROCEDURE — 88309 TISSUE EXAM BY PATHOLOGIST: CPT

## 2020-08-07 PROCEDURE — 85027 COMPLETE CBC AUTOMATED: CPT

## 2020-08-07 PROCEDURE — S2900: CPT

## 2020-08-07 PROCEDURE — C1889: CPT

## 2020-08-07 PROCEDURE — 86901 BLOOD TYPING SEROLOGIC RH(D): CPT

## 2020-08-07 PROCEDURE — 86850 RBC ANTIBODY SCREEN: CPT

## 2020-08-07 PROCEDURE — 85025 COMPLETE CBC W/AUTO DIFF WBC: CPT

## 2020-08-07 PROCEDURE — 83735 ASSAY OF MAGNESIUM: CPT

## 2020-08-07 PROCEDURE — 82962 GLUCOSE BLOOD TEST: CPT

## 2020-08-07 PROCEDURE — 80048 BASIC METABOLIC PNL TOTAL CA: CPT

## 2020-08-07 RX ORDER — OXYCODONE HYDROCHLORIDE 5 MG/1
1 TABLET ORAL
Qty: 20 | Refills: 0
Start: 2020-08-07 | End: 2020-08-16

## 2020-08-07 RX ORDER — AMLODIPINE BESYLATE 2.5 MG/1
1 TABLET ORAL
Qty: 0 | Refills: 0 | DISCHARGE

## 2020-08-07 RX ADMIN — OXYCODONE HYDROCHLORIDE 10 MILLIGRAM(S): 5 TABLET ORAL at 16:03

## 2020-08-07 RX ADMIN — AMLODIPINE BESYLATE 10 MILLIGRAM(S): 2.5 TABLET ORAL at 09:39

## 2020-08-07 RX ADMIN — Medication 1000 MILLIGRAM(S): at 11:47

## 2020-08-07 RX ADMIN — Medication 1000 MILLIGRAM(S): at 06:30

## 2020-08-07 RX ADMIN — OXYCODONE HYDROCHLORIDE 10 MILLIGRAM(S): 5 TABLET ORAL at 10:30

## 2020-08-07 RX ADMIN — OXYCODONE HYDROCHLORIDE 10 MILLIGRAM(S): 5 TABLET ORAL at 09:39

## 2020-08-07 RX ADMIN — Medication 1000 MILLIGRAM(S): at 05:32

## 2020-08-07 RX ADMIN — OXYCODONE HYDROCHLORIDE 10 MILLIGRAM(S): 5 TABLET ORAL at 02:32

## 2020-08-07 RX ADMIN — OXYCODONE HYDROCHLORIDE 10 MILLIGRAM(S): 5 TABLET ORAL at 01:32

## 2020-08-07 RX ADMIN — OXYCODONE HYDROCHLORIDE 10 MILLIGRAM(S): 5 TABLET ORAL at 05:32

## 2020-08-07 RX ADMIN — Medication 1000 MILLIGRAM(S): at 12:30

## 2020-08-07 RX ADMIN — OXYCODONE HYDROCHLORIDE 10 MILLIGRAM(S): 5 TABLET ORAL at 06:30

## 2020-08-07 NOTE — PROGRESS NOTE ADULT - SUBJECTIVE AND OBJECTIVE BOX
POST-OP DAY: 3 s/p robotic assisted right hemicolectomy for cecal mass        SUBJECTIVE: Patient seen and examined bedside by chief resident. She is concerned about her incisional abdominal pain and states it is only fixed by tylenol and oxycodone together. Tolerating LRD without N/V. Ambulating well. Endorses flatus and BMs.     amLODIPine   Tablet 10 milliGRAM(s) Oral daily  enoxaparin Injectable 40 milliGRAM(s) SubCutaneous every 24 hours    MEDICATIONS  (PRN):  melatonin 3 milliGRAM(s) Oral once PRN Insomnia  ondansetron Injectable 4 milliGRAM(s) IV Push every 6 hours PRN Nausea and/or Vomiting  oxyCODONE    IR 5 milliGRAM(s) Oral every 4 hours PRN Moderate Pain (4 - 6)  oxyCODONE    IR 10 milliGRAM(s) Oral every 4 hours PRN Severe Pain (7 - 10)      I&O's Detail    06 Aug 2020 07:01  -  07 Aug 2020 07:00  --------------------------------------------------------  IN:    lactated ringers.: 680 mL    Oral Fluid: 400 mL  Total IN: 1080 mL    OUT:    Voided: 1650 mL  Total OUT: 1650 mL    Total NET: -570 mL          Vital Signs Last 24 Hrs  T(C): 36.9 (07 Aug 2020 04:55), Max: 37.1 (06 Aug 2020 17:04)  T(F): 98.4 (07 Aug 2020 04:55), Max: 98.8 (06 Aug 2020 17:04)  HR: 78 (07 Aug 2020 04:55) (74 - 90)  BP: 123/79 (07 Aug 2020 04:55) (123/79 - 149/83)  BP(mean): 93 (07 Aug 2020 04:55) (93 - 93)  RR: 17 (07 Aug 2020 04:55) (17 - 18)  SpO2: 95% (07 Aug 2020 04:55) (93% - 96%)    General: NAD, resting comfortably in bed  Pulm: Nonlabored breathing, no respiratory distress  Abd: soft, NT/ND, abdominal binder in place, incisions CDI and sealed with surgical glue  Extrem: REID, no edema    LABS:                        10.9   10.04 )-----------( 313      ( 06 Aug 2020 08:25 )             35.0     08-06    137  |  103  |  14  ----------------------------<  111<H>  3.4<L>   |  25  |  0.55    Ca    8.7      06 Aug 2020 08:25  Phos  2.4     08-06  Mg     2.1     08-06

## 2020-08-07 NOTE — DISCHARGE NOTE PROVIDER - HOSPITAL COURSE
54 year old female with PMH of HTN, cecal mass presents for right hemicolectomy. Patient originally presented to her PCP and cardiologist with worsening heartburn after switching blood pressure medications and was found to have iron deficiency anemia. Patient underwent EGD and colonoscopy on 7/27/20 which found a cecal mass that was biopsied. Patient presented for and underwent elective robotic-assisted right hemicolectomy. Post-operatively she had some increased pain and was treated with PO oxycodone and an abdominal binder which worked well. She clinically improved, ambulating well, tolerating a low fiber diet without nausea or vomiting. She is passing gas and having bowel movements and is ready for discharge home.

## 2020-08-07 NOTE — DISCHARGE NOTE NURSING/CASE MANAGEMENT/SOCIAL WORK - PATIENT PORTAL LINK FT
You can access the FollowMyHealth Patient Portal offered by [facility] by registering at the following website: [email or web address]. By joining [employer]’s FollowMyHealth portal, you will also be able to view your health information using other applications (apps) compatible with our system.

## 2020-08-07 NOTE — DISCHARGE NOTE PROVIDER - NSDCFUADDAPPT_GEN_ALL_CORE_FT
Please follow up with Dr. Fink in the next 2 weeks. Call his office to schedule an appointment: [phone]. Call the office if you experience increasing pain, nausea, vomiting, swelling, redness, or leakage from stoma site, temperature >101.4F.

## 2020-08-07 NOTE — DISCHARGE NOTE PROVIDER - NSDCMRMEDTOKEN_GEN_ALL_CORE_FT
amLODIPine 10 mg oral tablet: 1 tab(s) orally once a day  hydroCHLOROthiazide 25 mg oral tablet: 1 tab(s) orally once a day  Oxaydo 5 mg oral tablet: 1 tab(s) orally 2 times a day MDD:2

## 2020-08-07 NOTE — DISCHARGE NOTE PROVIDER - NSDCCPCAREPLAN_GEN_ALL_CORE_FT
PRINCIPAL DISCHARGE DIAGNOSIS  Diagnosis: Adenocarcinoma of cecum  Assessment and Plan of Treatment:

## 2020-08-07 NOTE — DISCHARGE NOTE PROVIDER - NSDCFUADDINST_GEN_ALL_CORE_FT
Please continue to eat a low fiber diet until follow up with Dr. Fink. You can take over the counter tylenol and oxycodone as prescribed for pain     Your oxycodone prescription was sent to:   [facility]    General Discharge Instructions:  Please resume all regular home medications unless specifically advised not to take a particular medication. Also, please take any new medications as prescribed.  Please get plenty of rest, continue to ambulate several times per day, and drink adequate amounts of fluids. Avoid lifting weights greater than 5-10 lbs until you follow-up with your surgeon, who will instruct you further regarding activity restrictions.  Avoid driving or operating heavy machinery while taking pain medications.  Please follow-up with your surgeon and Primary Care Provider (PCP) as advised.  Incision Care:  *Please call your doctor or nurse practitioner if you have increased pain, swelling, redness, or drainage from the incision site.  *Avoid swimming and baths until your follow-up appointment.  *You may shower, and wash surgical incisions with a mild soap and warm water. Gently pat the area dry.  *If you have staples, they will be removed at your follow-up appointment.  *If you have steri-strips, they will fall off on their own. Please remove any remaining strips 7-10 days after surgery.    Warning Signs:  Please call your doctor or nurse practitioner if you experience the following:  *You experience new chest pain, pressure, squeezing or tightness.  *New or worsening cough, shortness of breath, or wheeze.  *If you are vomiting and cannot keep down fluids or your medications.  *You are getting dehydrated due to continued vomiting, diarrhea, or other reasons. Signs of dehydration include dry mouth, rapid heartbeat, or feeling dizzy or faint when standing.  *You see blood or dark/black material when you vomit or have a bowel movement.  *You experience burning when you urinate, have blood in your urine, or experience a discharge.  *Your pain is not improving within 8-12 hours or is not gone within 24 hours. Call or return immediately if your pain is getting worse, changes location, or moves to your chest or back.  *You have shaking chills, or fever greater than 101.5 degrees Fahrenheit or 38 degrees Celsius.  *Any change in your symptoms, or any new symptoms that concern you.

## 2020-08-07 NOTE — PROGRESS NOTE ADULT - ASSESSMENT
55 y/o F w/ cecal mass now s/p Right hemicolectomy for cecal mass on 8/4. Tolerating LRD with good BF. Awaiting bx results and ready for discharge home.     LRD/IVF 40  ERAS protocol   Pain/Nausea control (Toradol allergy)  SQL/SCDs, OOBA, IS   Discharge today 8/7    Plan discussed with attending and chief resident.   ____________________________________________________  Trupti Aragon MD     PGY1 - Surgery Team 1

## 2020-08-07 NOTE — DISCHARGE NOTE PROVIDER - CARE PROVIDER_API CALL
Manuel Fink  COLON/RECTAL SURGERY  [address], [address] [place] [address]  Phone: [phone]  Fax: [phone]  Follow Up Time:

## 2020-08-07 NOTE — DISCHARGE NOTE NURSING/CASE MANAGEMENT/SOCIAL WORK - NSDCFUADDAPPT_GEN_ALL_CORE_FT
Please follow up with Dr. Fink in the next 2 weeks. Call his office to schedule an appointment: [phone]. Call the office if you experience increasing pain, nausea, vomiting, swelling, redness, or leakage from stoma site, temperature >101.4F.

## 2020-08-12 DIAGNOSIS — G47.00 INSOMNIA, UNSPECIFIED: ICD-10-CM

## 2020-08-12 DIAGNOSIS — C18.9 MALIGNANT NEOPLASM OF COLON, UNSPECIFIED: ICD-10-CM

## 2020-08-12 DIAGNOSIS — C18.0 MALIGNANT NEOPLASM OF CECUM: ICD-10-CM

## 2020-08-12 DIAGNOSIS — I10 ESSENTIAL (PRIMARY) HYPERTENSION: ICD-10-CM

## 2020-08-12 DIAGNOSIS — D50.0 IRON DEFICIENCY ANEMIA SECONDARY TO BLOOD LOSS (CHRONIC): ICD-10-CM

## 2020-08-24 ENCOUNTER — APPOINTMENT (OUTPATIENT)
Dept: COLORECTAL SURGERY | Facility: CLINIC | Age: 55
End: 2020-08-24
Payer: COMMERCIAL

## 2020-08-24 VITALS
WEIGHT: 180 LBS | DIASTOLIC BLOOD PRESSURE: 82 MMHG | BODY MASS INDEX: 30.73 KG/M2 | HEIGHT: 64 IN | HEART RATE: 80 BPM | TEMPERATURE: 98 F | SYSTOLIC BLOOD PRESSURE: 146 MMHG

## 2020-08-24 PROCEDURE — 99024 POSTOP FOLLOW-UP VISIT: CPT

## 2020-08-24 NOTE — ASSESSMENT
[FreeTextEntry1] : liberalize diet and activity.\par \par Advised surveillance\par CEA Q 6 month.\par Colonoscopy in 1 year.\par CT in 1 year.\par \par

## 2020-08-24 NOTE — PHYSICAL EXAM
[Abdomen Masses] : No abdominal masses [Abdomen Tenderness] : ~T No ~M abdominal tenderness [JVD] : no jugular venous distention  [No HSM] : no hepatosplenomegaly [Normal Breath Sounds] : Normal breath sounds [Normal Heart Sounds] : normal heart sounds [de-identified] : incisions well healed

## 2020-08-24 NOTE — HISTORY OF PRESENT ILLNESS
[FreeTextEntry1] : 53 yo F presents for f/u cecal mass, s/p robotic assisted right hemicolectomy on 8/4/20\par \par Patient reports she is doing well, appetite good.\par Reports occasional cramping on LUQ, improved w/ passing gas\par Hx of rectal itching on POD5, GYN prescribed topical cream for 2-3 days w/ temporary improvement. Denies itching at present. Patient believes may be related to more frequent BMs due to bowel prep.\par Eating broccoli and sweet potatoes\par \par BH: 1-2 times daily\par Reducing intake of stool softener\par Surgical Final Report\par \par Final Diagnosis\par \par 1.  Right colon:\par - Infiltrating moderately differentiated cecal colonic\par adenocarcinoma, 6.8cm\par - The tumor infiltrates into but not beyond muscularis\par propria.\par - Ileal and colonic resection margins are negative for\par tumor.\par - Unremarkable appendix.\par - 31 regional lymph nodes negative for tumor (0/31).\par - MSI and molecular studies pending and will be reported\par separately (1B)\par \par See synoptic report.\par Synoptic Summary\par Procedure\par Right hemicolectomy\par Tumor site\par Cecum\par Tumor size\par 6.8 cm\par Macroscopic Tumor, Perforation\par Not identified\par Histologic type\par Adenocarcinoma\par Histologic grade\par Moderately differentiated\par Tumor extension\par Tumor invades muscularis propria\par Margins\par All margins are uninvolved by invasive carcinoma, high grade\par dysplasia, intramucosal adenocarcinoma and adenoma (proximal\par ileal distal colonic and radial)\par Treatment effect\par No known presurgical therapy\par Lymphovascular Invasion\par Not identified\par Perineural invasion\par Not identified\par Tumor deposits\par Not identified\par Regional lymph nodes\par 31 lymph nodes, negative for tumor (0/31).\par Pathologic stage classification, pTMN AJCC eighth edition\par pT2N0\par INTERPRETATION\par NO LOSS OF NUCLEAR EXPRESSION OF MMR PROTEINS: LOW PROBABILITY OF\par MSI-H.\par RESULT SUMMARY: ABNORMAL\par DETECTED GENOMIC ALTERATIONS:\par Tier 1: Variants of Strong Clinical Significance\par PIK3CA p. [id number]\par KRAS p.[id number]\par TUMOR TYPE: Colon Adenocarcinoma\par CLINICAL INFORMATION:\par Right colon biopsy showed moderately differentiated cecal colonic\par adenocarcinoma (#[id number]).\par \par PERTINENT NEGATIVE RESULTS:\par The following genes are NEGATIVE for clinically relevant\par mutations. Mutational hotspots and surrounding exonic regions\par were interrogated for DNA level point mutations and indels\par (fusions not assayed).\par AKT1, BRAF, EGFR, MAP2K1, NRAS.\par \par THERAPEUTIC ASSOCIATIONS\par In Patient's Tumor Type\par Gene/Locus     KRAS\par Alteration     p.[id number]\par Potential Therapeutic Response/ Drug Class    Associated with\par Decreased Sensitivity to Anti-EGFR Therapeutic Agents, Cetuximab\par and Panitumumab\par Disease Association       Colorectal Adenocarcinoma\par \par INTERPRETATION SUMMARY\par A hotspot mutation in Kras (p.[id number]) was detected in this\par patient's sample.\par \par \par \par \par

## 2020-10-06 NOTE — PATIENT PROFILE ADULT - NSPROEDAREADYLEARN_GEN_A_NUR
none Composite Graft Text: The defect edges were debeveled with a #15 scalpel blade.  Given the location of the defect, shape of the defect, the proximity to free margins and the fact the defect was full thickness a composite graft was deemed most appropriate.  The defect was outline and then transferred to the donor site.  A full thickness graft was then excised from the donor site. The graft was then placed in the primary defect, oriented appropriately and then sutured into place.  The secondary defect was then repaired using a primary closure.

## 2021-07-19 ENCOUNTER — APPOINTMENT (OUTPATIENT)
Dept: COLORECTAL SURGERY | Facility: CLINIC | Age: 56
End: 2021-07-19
Payer: COMMERCIAL

## 2021-07-19 VITALS
HEART RATE: 82 BPM | WEIGHT: 197 LBS | TEMPERATURE: 98.1 F | SYSTOLIC BLOOD PRESSURE: 137 MMHG | BODY MASS INDEX: 33.63 KG/M2 | DIASTOLIC BLOOD PRESSURE: 82 MMHG | HEIGHT: 64 IN

## 2021-07-19 PROCEDURE — 99072 ADDL SUPL MATRL&STAF TM PHE: CPT

## 2021-07-19 PROCEDURE — 99214 OFFICE O/P EST MOD 30 MIN: CPT

## 2021-07-19 NOTE — ASSESSMENT
[FreeTextEntry1] : Advised continued surveillance colonoscopy in 2 years.\par \par Recommend surveillance CT scan.\par \par Continued routine laboratory with CBC SMA-20 and CEA q.6 month

## 2021-07-19 NOTE — PHYSICAL EXAM
[Abdomen Masses] : No abdominal masses [Abdomen Tenderness] : ~T No ~M abdominal tenderness [No HSM] : no hepatosplenomegaly [JVD] : no jugular venous distention  [Normal Breath Sounds] : Normal breath sounds [Normal Heart Sounds] : normal heart sounds [de-identified] : incisions well healed

## 2021-07-19 NOTE — HISTORY OF PRESENT ILLNESS
[FreeTextEntry1] : 56 y/o F presents for f/u T2N0 colon cancer\par S/p robotic right hemicolectomy 8/4/20\par \par Last imaging completed July 2020 prior to surgery\par \par BH: regular\par Denies abdominal pain. good energy\par \par CEA 2/18/21 was 0.9 \par \par \par Colonoscopy complete 7/14/21 at [facility]\par - skin tags were found on perianal exam\par - evidence of prior end-to-side ileo-colonic anastomosis in the transverse colon. This was patent and was characterized by healthy appearing mucosa, an intact staple line and visible sutures. The anastomosis was traversed \par - the pavan-terminal ileum contained a few non-bleeding aphthae. No stigmata of recentl bleeding were seen\par - medium polyp found in the transverse colon, the polyp was semi-sessile. There polyp was removed with jumbo cold forceps. Resection and retrieval were complete\par - few small-mouthed diverticula were found in the descending colon\par - medium polyp was found in the recto-sigmoid colon, polyp was flat, removed with jumbo cold forceps\par \par

## 2022-03-17 ENCOUNTER — TRANSCRIPTION ENCOUNTER (OUTPATIENT)
Age: 57
End: 2022-03-17

## 2022-05-27 ENCOUNTER — TRANSCRIPTION ENCOUNTER (OUTPATIENT)
Age: 57
End: 2022-05-27

## 2022-06-24 ENCOUNTER — NON-APPOINTMENT (OUTPATIENT)
Age: 57
End: 2022-06-24

## 2022-06-27 ENCOUNTER — TRANSCRIPTION ENCOUNTER (OUTPATIENT)
Age: 57
End: 2022-06-27

## 2022-07-11 ENCOUNTER — NON-APPOINTMENT (OUTPATIENT)
Age: 57
End: 2022-07-11

## 2022-07-11 ENCOUNTER — APPOINTMENT (OUTPATIENT)
Dept: COLORECTAL SURGERY | Facility: CLINIC | Age: 57
End: 2022-07-11

## 2022-07-11 VITALS
BODY MASS INDEX: 27.49 KG/M2 | HEART RATE: 69 BPM | WEIGHT: 161 LBS | HEIGHT: 64 IN | SYSTOLIC BLOOD PRESSURE: 132 MMHG | DIASTOLIC BLOOD PRESSURE: 83 MMHG | TEMPERATURE: 98.4 F

## 2022-07-11 DIAGNOSIS — C18.9 MALIGNANT NEOPLASM OF COLON, UNSPECIFIED: ICD-10-CM

## 2022-07-11 PROCEDURE — 99214 OFFICE O/P EST MOD 30 MIN: CPT

## 2022-07-11 NOTE — ASSESSMENT
[FreeTextEntry1] : History of stage I colon cancer no evidence of recurrent disease.  Recommend surveillance colonoscopy in 1 year.  Recommend repeat interval CT scan imaging in 1 year.\par \par

## 2022-07-11 NOTE — HISTORY OF PRESENT ILLNESS
[FreeTextEntry1] : 56 y/o F presents for f/u T2N0 colon cancer\par S/p robotic right hemicolectomy 8/4/20\par \par Last imaging completed June 2021-left axillary and left supraclavicular adenopathy.\par \par BH: regular\par Denies abdominal pain. good energy\par \par CEA 2022 was 1.0\par \par Reports recent issues with left chest pressure and discomfort.  Seen by cardiology and oncology.  Medications adjusted.  Recent vaccine given right before her recent CAT scan.\par Colonoscopy complete 7/14/21 at [facility]\par - skin tags were found on perianal exam\par - evidence of prior end-to-side ileo-colonic anastomosis in the transverse colon. This was patent and was characterized by healthy appearing mucosa, an intact staple line and visible sutures. The anastomosis was traversed \par - the pavan-terminal ileum contained a few non-bleeding aphthae. No stigmata of recentl bleeding were seen\par - medium polyp found in the transverse colon, the polyp was semi-sessile. There polyp was removed with jumbo cold forceps. Resection and retrieval were complete\par - few small-mouthed diverticula were found in the descending colon\par - medium polyp was found in the recto-sigmoid colon, polyp was flat, removed with jumbo cold forceps\par \par

## 2022-07-11 NOTE — PHYSICAL EXAM
[Abdomen Masses] : No abdominal masses [Abdomen Tenderness] : ~T No ~M abdominal tenderness [No HSM] : no hepatosplenomegaly [JVD] : no jugular venous distention  [Normal Breath Sounds] : Normal breath sounds [Normal Heart Sounds] : normal heart sounds [de-identified] : incisions well healed

## 2022-08-16 NOTE — DISCHARGE NOTE PROVIDER - NSDCCPGOAL_GEN_ALL_CORE_FT
To get better and follow your care plan as instructed.
Unable to assess due to patient's cognitive impairment